# Patient Record
Sex: FEMALE | Race: WHITE | Employment: FULL TIME | URBAN - METROPOLITAN AREA
[De-identification: names, ages, dates, MRNs, and addresses within clinical notes are randomized per-mention and may not be internally consistent; named-entity substitution may affect disease eponyms.]

---

## 2019-07-08 ENCOUNTER — HOSPITAL ENCOUNTER (OUTPATIENT)
Dept: WOMENS IMAGING | Age: 44
Discharge: HOME | End: 2019-07-10
Payer: COMMERCIAL

## 2019-07-08 DIAGNOSIS — Z12.31 ENCOUNTER FOR SCREENING MAMMOGRAM FOR BREAST CANCER: ICD-10-CM

## 2019-07-08 PROCEDURE — 77063 BREAST TOMOSYNTHESIS BI: CPT

## 2019-07-15 ENCOUNTER — HOSPITAL ENCOUNTER (OUTPATIENT)
Dept: WOMENS IMAGING | Age: 44
Discharge: HOME | End: 2019-07-17
Payer: COMMERCIAL

## 2019-07-15 ENCOUNTER — HOSPITAL ENCOUNTER (OUTPATIENT)
Dept: ULTRASOUND IMAGING | Age: 44
Discharge: HOME | End: 2019-07-17
Payer: COMMERCIAL

## 2019-07-15 DIAGNOSIS — R92.8 ABNORMAL MAMMOGRAM: ICD-10-CM

## 2019-07-15 PROCEDURE — G0279 TOMOSYNTHESIS, MAMMO: HCPCS

## 2019-07-15 PROCEDURE — 76642 ULTRASOUND BREAST LIMITED: CPT

## 2020-12-28 ENCOUNTER — HOSPITAL ENCOUNTER (OUTPATIENT)
Dept: WOMENS IMAGING | Age: 45
Discharge: HOME | End: 2020-12-30
Payer: COMMERCIAL

## 2020-12-28 PROCEDURE — 77067 SCR MAMMO BI INCL CAD: CPT

## 2021-01-14 ENCOUNTER — HOSPITAL ENCOUNTER (OUTPATIENT)
Dept: WOMENS IMAGING | Age: 46
Discharge: HOME | End: 2021-01-16
Payer: COMMERCIAL

## 2021-01-14 DIAGNOSIS — R92.8 ABNORMAL MAMMOGRAM: ICD-10-CM

## 2021-01-14 PROCEDURE — G0279 TOMOSYNTHESIS, MAMMO: HCPCS

## 2022-05-06 ENCOUNTER — OFFICE VISIT (OUTPATIENT)
Dept: OBGYN CLINIC | Age: 47
End: 2022-05-06
Payer: COMMERCIAL

## 2022-05-06 VITALS — BODY MASS INDEX: 38.41 KG/M2 | HEIGHT: 64 IN | WEIGHT: 225 LBS

## 2022-05-06 DIAGNOSIS — Z12.31 ENCOUNTER FOR SCREENING MAMMOGRAM FOR MALIGNANT NEOPLASM OF BREAST: ICD-10-CM

## 2022-05-06 DIAGNOSIS — Z01.419 WOMEN'S ANNUAL ROUTINE GYNECOLOGICAL EXAMINATION: ICD-10-CM

## 2022-05-06 DIAGNOSIS — N94.6 DYSMENORRHEA: ICD-10-CM

## 2022-05-06 DIAGNOSIS — Z12.4 ENCOUNTER FOR PAP SMEAR OF CERVIX WITH HPV DNA COTESTING: ICD-10-CM

## 2022-05-06 DIAGNOSIS — Z01.419 WOMEN'S ANNUAL ROUTINE GYNECOLOGICAL EXAMINATION: Primary | ICD-10-CM

## 2022-05-06 PROCEDURE — 99386 PREV VISIT NEW AGE 40-64: CPT | Performed by: ADVANCED PRACTICE MIDWIFE

## 2022-05-06 RX ORDER — IBUPROFEN 800 MG/1
800 TABLET ORAL EVERY 8 HOURS PRN
Qty: 60 TABLET | Refills: 1 | Status: SHIPPED | OUTPATIENT
Start: 2022-05-06 | End: 2022-05-21

## 2022-05-06 ASSESSMENT — PATIENT HEALTH QUESTIONNAIRE - PHQ9
SUM OF ALL RESPONSES TO PHQ9 QUESTIONS 1 & 2: 0
SUM OF ALL RESPONSES TO PHQ QUESTIONS 1-9: 0
1. LITTLE INTEREST OR PLEASURE IN DOING THINGS: 0
2. FEELING DOWN, DEPRESSED OR HOPELESS: 0

## 2022-05-06 ASSESSMENT — ENCOUNTER SYMPTOMS
COUGH: 0
TROUBLE SWALLOWING: 0
VOICE CHANGE: 0
DIARRHEA: 0
ABDOMINAL PAIN: 0
RHINORRHEA: 0
SORE THROAT: 0
NAUSEA: 0
CONSTIPATION: 0
VOMITING: 0
SHORTNESS OF BREATH: 0

## 2022-05-06 NOTE — PROGRESS NOTES
Chief Complaint:     Thang Fernández is a 51 y.o. female who presents here today for complaints of:      Chief Complaint   Patient presents with    Annual Exam     History of Present Illness:     Thang Fernández is a 51 y.o. female who presents for her annual exam.    Concerns Today:    None    Prior Pap History:   1/7/14 - NILM, HPV Negative    Menstrual Cycle:  Becoming Irregular - occurring approximately every 23-40 days  Duration - 3-5 days  Volume - \"Normal\"  Mid-cycle spotting/bleeding - No  Postcoital bleeding - No  Discomfort/Cramping - symptom severity is moderate, occurring first 1-2 days of flow    Past Medical History: Allergies:  Patient has no known allergies. Patient's last menstrual period was 04/26/2022. Obstetrical History:  No obstetric history on file. History reviewed. No pertinent past medical history. Medications:     No current outpatient medications on file prior to visit. No current facility-administered medications on file prior to visit. Review of Systems:     Review of Systems   Constitutional: Negative for activity change, appetite change, chills, diaphoresis, fatigue, fever and unexpected weight change. HENT: Negative for congestion, postnasal drip, rhinorrhea, sneezing, sore throat, trouble swallowing and voice change. Respiratory: Negative for cough and shortness of breath. Cardiovascular: Negative for chest pain. Gastrointestinal: Negative for abdominal pain, constipation, diarrhea, nausea and vomiting. Genitourinary: Positive for menstrual problem. Negative for difficulty urinating, dyspareunia, dysuria, frequency, genital sores, pelvic pain, vaginal bleeding, vaginal discharge and vaginal pain. Musculoskeletal: Negative for arthralgias and myalgias. Neurological: Negative for dizziness, syncope and headaches. Hematological: Negative for adenopathy. All other systems reviewed and are negative.     Physical Exam:     Vitals:  Ht 5' 4\" (1.626 m) Wt 225 lb (102.1 kg)   LMP 04/26/2022   BMI 38.62 kg/m²     Physical Exam  Vitals and nursing note reviewed. Constitutional:       General: She is not in acute distress. Appearance: Normal appearance. She is not ill-appearing, toxic-appearing or diaphoretic. HENT:      Head: Normocephalic. Nose: No congestion or rhinorrhea. Mouth/Throat:      Mouth: Mucous membranes are moist.   Eyes:      General: No scleral icterus. Right eye: No discharge. Left eye: No discharge. Cardiovascular:      Rate and Rhythm: Normal rate and regular rhythm. Pulses: Normal pulses. Pulmonary:      Effort: Pulmonary effort is normal. No respiratory distress. Abdominal:      Palpations: Abdomen is soft. Hernia: There is no hernia in the left inguinal area or right inguinal area. Genitourinary:     General: Normal vulva. Exam position: Lithotomy position. Pubic Area: No rash or pubic lice. Labia:         Right: No rash, tenderness, lesion or injury. Left: No rash, tenderness, lesion or injury. Urethra: No prolapse, urethral pain, urethral swelling or urethral lesion. Vagina: No signs of injury and foreign body. No vaginal discharge, erythema, tenderness, bleeding, lesions or prolapsed vaginal walls. Cervix: No cervical motion tenderness, discharge, friability, lesion, erythema, cervical bleeding or eversion. Uterus: Not deviated, not enlarged, not fixed, not tender and no uterine prolapse. Adnexa:         Right: No mass, tenderness or fullness. Left: No mass, tenderness or fullness. Rectum: No mass or external hemorrhoid. Musculoskeletal:         General: Normal range of motion. Cervical back: Normal range of motion and neck supple. Right lower leg: No edema. Left lower leg: No edema. Lymphadenopathy:      Lower Body: No right inguinal adenopathy. No left inguinal adenopathy.    Skin:     General: Skin is warm and dry. Capillary Refill: Capillary refill takes less than 2 seconds. Coloration: Skin is not jaundiced or pale. Neurological:      Mental Status: She is alert and oriented to person, place, and time. Mental status is at baseline. Motor: No weakness. Coordination: Coordination normal.      Gait: Gait normal.   Psychiatric:         Mood and Affect: Mood normal.         Behavior: Behavior normal.         Assessment:      Diagnosis Orders   1. Women's annual routine gynecological examination  PAP SMEAR   2. Encounter for Pap smear of cervix with HPV DNA cotesting  PAP SMEAR   3. Encounter for screening mammogram for malignant neoplasm of breast  [facility] DIGITAL SCREEN W OR WO CAD BILATERAL   4. Dysmenorrhea  ibuprofen (ADVIL;MOTRIN) 800 MG tablet     Plan:     1. Annual Exam, Screening for STD's  Pap - Collected  Screening for STD's - Declined    2. Dysmenorrhea, Perimenopausal  Periods are beginning to become irregular. Use ibuprofen as needed to relieve cramping. 3. Screening for Breast Cancer  Mammogram referral given    Follow Up:  Return in about 1 year (around 5/6/2023) for Annual Well Woman Visit. Orders Placed This Encounter   Procedures    [facility] DIGITAL SCREEN W OR WO CAD BILATERAL     Standing Status:   Future     Standing Expiration Date:   5/6/2023     Order Specific Question:   Reason for exam:     Answer:   Screening mammogram    PAP SMEAR     Standing Status:   Future     Standing Expiration Date:   5/6/2023     Order Specific Question:   Collection Type     Answer: Thin Prep     Order Specific Question:   Prior Abnormal Pap Test     Answer:   No     Order Specific Question:   Screening or Diagnostic     Answer:   Screening     Order Specific Question:   HPV Requested?      Answer:   Yes     Comments:   16/18     Order Specific Question:   High Risk Patient     Answer:   N/A     Orders Placed This Encounter   Medications    ibuprofen (ADVIL;MOTRIN) 800 MG tablet     Sig: Take 1 tablet by mouth every 8 hours as needed for Pain     Dispense:  60 tablet     Refill:  [facility], [facility]

## 2022-05-11 LAB
HPV COMMENT: NORMAL
HPV TYPE 16: NOT DETECTED
HPV TYPE 18: NOT DETECTED
HPVOH (OTHER TYPES): NOT DETECTED

## 2022-05-16 DIAGNOSIS — N76.0 BACTERIAL VAGINITIS: Primary | ICD-10-CM

## 2022-05-16 DIAGNOSIS — B96.89 BACTERIAL VAGINITIS: Primary | ICD-10-CM

## 2022-05-16 RX ORDER — METRONIDAZOLE 500 MG/1
500 TABLET ORAL 2 TIMES DAILY
Qty: 14 TABLET | Refills: 1 | Status: SHIPPED | OUTPATIENT
Start: 2022-05-16 | End: 2022-08-14

## 2022-05-17 NOTE — RESULT ENCOUNTER NOTE
1.  Please send a normal pap letter    2. Pap shows BV  Rx:  Flagyl 500m tablet BID x7 days  Pharmacy:  [address] [place] [address] [address] [place] [id number] [place]:  No Access    Please make sure patient is aware. Thanks!

## 2022-06-10 ENCOUNTER — HOSPITAL ENCOUNTER (OUTPATIENT)
Dept: WOMENS IMAGING | Age: 47
Discharge: HOME | End: 2022-06-12
Payer: COMMERCIAL

## 2022-06-10 VITALS — BODY MASS INDEX: 38.62 KG/M2 | HEIGHT: 64 IN

## 2022-06-10 DIAGNOSIS — Z12.31 ENCOUNTER FOR SCREENING MAMMOGRAM FOR MALIGNANT NEOPLASM OF BREAST: ICD-10-CM

## 2022-06-10 PROCEDURE — 77063 BREAST TOMOSYNTHESIS BI: CPT

## 2023-09-21 ENCOUNTER — TELEPHONE (OUTPATIENT)
Dept: PRIMARY CARE | Facility: CLINIC | Age: 48
End: 2023-09-21

## 2023-09-21 NOTE — TELEPHONE ENCOUNTER
Pt is requesting a referral for knee pain to orthopedics as well as an order for a mammogram   Please advise if pt needs an appointment or if you will just put these orders in, thanks!

## 2023-09-22 DIAGNOSIS — Z12.31 BREAST CANCER SCREENING BY MAMMOGRAM: Primary | ICD-10-CM

## 2023-09-22 PROBLEM — M25.562 KNEE PAIN, BILATERAL: Status: ACTIVE | Noted: 2023-09-22

## 2023-09-22 PROBLEM — R92.8 ABNORMAL MAMMOGRAM: Status: ACTIVE | Noted: 2023-09-22

## 2023-09-22 PROBLEM — M25.561 KNEE PAIN, BILATERAL: Status: ACTIVE | Noted: 2023-09-22

## 2023-10-02 ENCOUNTER — OFFICE VISIT (OUTPATIENT)
Dept: ORTHOPEDIC SURGERY | Facility: CLINIC | Age: 48
End: 2023-10-02
Payer: COMMERCIAL

## 2023-10-02 ENCOUNTER — ANCILLARY PROCEDURE (OUTPATIENT)
Dept: RADIOLOGY | Facility: CLINIC | Age: 48
End: 2023-10-02
Payer: COMMERCIAL

## 2023-10-02 DIAGNOSIS — M25.562 ACUTE BILATERAL KNEE PAIN: ICD-10-CM

## 2023-10-02 DIAGNOSIS — M25.561 ACUTE BILATERAL KNEE PAIN: Primary | ICD-10-CM

## 2023-10-02 DIAGNOSIS — M25.561 ACUTE BILATERAL KNEE PAIN: ICD-10-CM

## 2023-10-02 DIAGNOSIS — M25.562 ACUTE BILATERAL KNEE PAIN: Primary | ICD-10-CM

## 2023-10-02 PROCEDURE — 73564 X-RAY EXAM KNEE 4 OR MORE: CPT | Mod: BILATERAL PROCEDURE | Performed by: ORTHOPAEDIC SURGERY

## 2023-10-02 PROCEDURE — 2500000005 HC RX 250 GENERAL PHARMACY W/O HCPCS: Performed by: ORTHOPAEDIC SURGERY

## 2023-10-02 PROCEDURE — 99213 OFFICE O/P EST LOW 20 MIN: CPT | Performed by: ORTHOPAEDIC SURGERY

## 2023-10-02 PROCEDURE — 73564 X-RAY EXAM KNEE 4 OR MORE: CPT | Mod: 50

## 2023-10-02 PROCEDURE — 99203 OFFICE O/P NEW LOW 30 MIN: CPT | Performed by: ORTHOPAEDIC SURGERY

## 2023-10-02 PROCEDURE — 2500000004 HC RX 250 GENERAL PHARMACY W/ HCPCS (ALT 636 FOR OP/ED): Performed by: ORTHOPAEDIC SURGERY

## 2023-10-02 PROCEDURE — 20610 DRAIN/INJ JOINT/BURSA W/O US: CPT | Mod: LT | Performed by: ORTHOPAEDIC SURGERY

## 2023-10-02 RX ORDER — IBUPROFEN 800 MG/1
800 TABLET ORAL 3 TIMES DAILY
Qty: 90 TABLET | Refills: 0 | Status: CANCELLED | OUTPATIENT
Start: 2023-10-02 | End: 2023-11-01

## 2023-10-02 RX ORDER — IBUPROFEN 800 MG/1
800 TABLET ORAL 3 TIMES DAILY
Qty: 90 TABLET | Refills: 0 | Status: SHIPPED | OUTPATIENT
Start: 2023-10-02 | End: 2023-11-01

## 2023-10-02 RX ORDER — LIDOCAINE HYDROCHLORIDE 10 MG/ML
8 INJECTION INFILTRATION; PERINEURAL
Status: COMPLETED | OUTPATIENT
Start: 2023-10-02 | End: 2023-10-02

## 2023-10-02 RX ORDER — TRIAMCINOLONE ACETONIDE 40 MG/ML
40 INJECTION, SUSPENSION INTRA-ARTICULAR; INTRAMUSCULAR
Status: COMPLETED | OUTPATIENT
Start: 2023-10-02 | End: 2023-10-02

## 2023-10-02 RX ADMIN — TRIAMCINOLONE ACETONIDE 40 MG: 40 INJECTION, SUSPENSION INTRA-ARTICULAR; INTRAMUSCULAR at 14:52

## 2023-10-02 RX ADMIN — LIDOCAINE HYDROCHLORIDE 8 ML: 10 INJECTION, SOLUTION INFILTRATION; PERINEURAL at 14:52

## 2023-10-02 NOTE — PROGRESS NOTES
History of Present Illness  Bilateral knee pain    History of Present Illness   Patient presents today for chief complaint of chronic bilateral knee pain.  States that the knees have been acting up the past 3 to 4 months without relief.  She does take anti-inflammatories it does take the edge off a little bit.  She denies any surgeries or previous injections into the knees denies any instability no locking.  She has not used any type of compression sleeve       No past medical history on file.    Medication Documentation Review Audit    **Prior to Admission medications have not yet been reviewed**         No Known Allergies    Social History     Socioeconomic History    Marital status:      Spouse name: Not on file    Number of children: Not on file    Years of education: Not on file    Highest education level: Not on file   Occupational History    Not on file   Tobacco Use    Smoking status: Not on file    Smokeless tobacco: Not on file   Substance and Sexual Activity    Alcohol use: Not on file    Drug use: Not on file    Sexual activity: Not on file   Other Topics Concern    Not on file   Social History Narrative    Not on file     Social Determinants of Health     Financial Resource Strain: Not on file   Food Insecurity: Not on file   Transportation Needs: Not on file   Physical Activity: Not on file   Stress: Not on file   Social Connections: Not on file   Intimate Partner Violence: Not on file   Housing Stability: Not on file       No past surgical history on file.      Review of Systems   GENERAL: Negative for malaise, significant weight loss, fever  MUSCULOSKELETAL: see HPI  NEURO:  Negative     Physical Exam  VS documented in chart  Head: NC/AT  Skin: Intact  Left knee exam shows skin intact full range of motion of the knee there is some retropatellar crepitation with passive range of motion.  There is medial joint line tenderness palpation as well as some tenderness over the inferior pole of  the patella.  Knee is stable/throughout lower extremity gross neurovascular intact    Right knee exam shows skin intact with no erythema no signs of infection trace effusion appreciated on exam today.  There is medial joint line tenderness palpation slight varus alignment of the knee.  Knee is stable/throughout lower extremity gross neurovascular intact     Radiographs  No images are attached to the encounter.     Assessment  Osteoarthrosis side: bilateral knee      Plan  We discussed with the patient the diagnosis of degenerative joint disease of the knee.  We reviewed an evidence-based approach to osteoarthritis of the knee.  We strongly encouraged low-impact aerobic activity and non-opioid analgesics.     We discussed temporary pain relief with corticosteroid injections and the associated risks.  We also discussed the conflicting evidence regarding viscosupplementation and potential long-term risks with NSAID´s.  We reviewed the role of bracing for instability and physical therapy for atrophy and gait abnormalities.  We reviewed that the only curative process is for a joint arthroplasty.  The patient elected for Injections  Plan:  1.  Medrol Dosepak: Oral steroid.  Deffered  2.  NSAID: Prescription sent to the pharmacy.  GI side effects and medical risks discussed.    Ibuprofen 800 mg  3.  Ice: 30 minutes on/off  4.  Bracing: Patient will decide on over-the-counter versus prescription bracing  5.  Home exercise program/medically directed therapy handout was given    L Inj/Asp: L knee on 10/2/2023 2:52 PM  Indications: pain  Details: 22 G needle, anterolateral approach  Medications: 40 mg triamcinolone acetonide 40 mg/mL; 8 mL lidocaine 10 mg/mL (1 %)  Outcome: tolerated well, no immediate complications  Procedure, treatment alternatives, risks and benefits explained, specific risks discussed. Consent was given by the patient. Immediately prior to procedure a time out was called to verify the correct patient,  procedure, equipment, support staff and site/side marked as required. Patient was prepped and draped in the usual sterile fashion.            I will see them in 1 month for recheck, sooner if there is any problems.  Questions were answered.

## 2023-10-02 NOTE — LETTER
October 2, 2023     Antoine Sosa DO  [address]  [place]  [facility] [address]    Patient: Josefina Meneses   YOB: 1975   Date of Visit: 10/2/2023       Dear Dr. Antoine Sosa DO:    Thank you for referring Josefina Meneses to me for evaluation. Below are my notes for this consultation.  If you have questions, please do not hesitate to call me. I look forward to following your patient along with you.       Sincerely,     Diego Garcia MD      CC: No Recipients  ______________________________________________________________________________________         History of Present Illness  No chief complaint on file.    History of Present Illness   Patient presents today for chief complaint of chronic bilateral knee pain.  States that the knees have been acting up the past 3 to 4 months without relief.  She does take anti-inflammatories it does take the edge off a little bit.  She denies any surgeries or previous injections into the knees denies any instability no locking.  She has not used any type of compression sleeve       No past medical history on file.    Medication Documentation Review Audit    **Prior to Admission medications have not yet been reviewed**         No Known Allergies    Social History     Socioeconomic History   • Marital status:      Spouse name: Not on file   • Number of children: Not on file   • Years of education: Not on file   • Highest education level: Not on file   Occupational History   • Not on file   Tobacco Use   • Smoking status: Not on file   • Smokeless tobacco: Not on file   Substance and Sexual Activity   • Alcohol use: Not on file   • Drug use: Not on file   • Sexual activity: Not on file   Other Topics Concern   • Not on file   Social History Narrative   • Not on file     Social Determinants of Health     Financial Resource Strain: Not on file   Food Insecurity: Not on file   Transportation Needs: Not on file   Physical Activity: Not on file   Stress: Not  on file   Social Connections: Not on file   Intimate Partner Violence: Not on file   Housing Stability: Not on file       No past surgical history on file.      Review of Systems   GENERAL: Negative for malaise, significant weight loss, fever  MUSCULOSKELETAL: see HPI  NEURO:  Negative     Exam  {side:62973} Knee:  Skin healthy and intact  No gross swelling or ecchymosis  Alignment: {Desc; knee alignment:23612}     Effusion: {MILD, MOD, SEV:17544}        ROM: {knee range of motion:48694}  {MILD, MOD, SEV:97736} Crepitus with range of motion  Tenderness to palpation over medial and lateral joint line and with patellar compression      No laxity to valgus stress  No laxity to varus stress  Negative Lachman´s test  Negative posterior drawer test  Mild pain with Clayton´s test  Logrolling of hip {POSITIVE/NEGATIVE:11611}  No pain with internal rotation of the hip  Straight leg raise negative  Neurovascular exam normal distally  2+ DP pulse and good cap refill  Physical Exam  VS documented in chart  Head: NC/AT  Skin: Intact  Left knee exam shows skin intact full range of motion of the knee there is some retropatellar crepitation with passive range of motion.  There is medial joint line tenderness palpation as well as some tenderness over the inferior pole of the patella.  Knee is stable/throughout lower extremity gross neurovascular intact    Right knee exam shows skin intact with no erythema no signs of infection trace effusion appreciated on exam today.  There is medial joint line tenderness palpation slight varus alignment of the knee.  Knee is stable/throughout lower extremity gross neurovascular intact     Radiographs  No images are attached to the encounter.     Assessment  Osteoarthrosis side: bilateral knee      Plan  We discussed with the patient the diagnosis of degenerative joint disease of the knee.  We reviewed an evidence-based approach to osteoarthritis of the knee.  We strongly encouraged low-impact  aerobic activity and non-opioid analgesics.     We discussed temporary pain relief with corticosteroid injections and the associated risks.  We also discussed the conflicting evidence regarding viscosupplementation and potential long-term risks with NSAID´s.  We reviewed the role of bracing for instability and physical therapy for atrophy and gait abnormalities.  We reviewed that the only curative process is for a joint arthroplasty.  The patient elected for Injections  Plan:  1.  Medrol Dosepak: Oral steroid.  Deffered  2.  NSAID: Prescription sent to the pharmacy.  GI side effects and medical risks discussed.    Ibuprofen 800 mg  3.  Ice: 30 minutes on/off  4.  Bracing: Patient will decide on over-the-counter versus prescription bracing  5.  Home exercise program/medically directed therapy handout was given    L Inj/Asp: L knee on 10/2/2023 2:52 PM  Indications: pain  Details: 22 G needle, anterolateral approach  Medications: 40 mg triamcinolone acetonide 40 mg/mL; 8 mL lidocaine 10 mg/mL (1 %)  Outcome: tolerated well, no immediate complications  Procedure, treatment alternatives, risks and benefits explained, specific risks discussed. Consent was given by the patient. Immediately prior to procedure a time out was called to verify the correct patient, procedure, equipment, support staff and site/side marked as required. Patient was prepped and draped in the usual sterile fashion.            I will see them in 1 month for recheck, sooner if there is any problems.  Questions were answered.

## 2023-10-30 ENCOUNTER — OFFICE VISIT (OUTPATIENT)
Dept: ORTHOPEDIC SURGERY | Facility: CLINIC | Age: 48
End: 2023-10-30
Payer: COMMERCIAL

## 2023-10-30 DIAGNOSIS — M17.10 ARTHRITIS OF KNEE: Primary | ICD-10-CM

## 2023-10-30 PROCEDURE — 99213 OFFICE O/P EST LOW 20 MIN: CPT | Performed by: ORTHOPAEDIC SURGERY

## 2023-10-30 NOTE — PROGRESS NOTES
History of Present Illness   Patient presents for follow-up from her left knee cortisone injection.  States that knee felt about 40 to 50% better.  She was also on vacation.  She still has pain on the inside part of the knee and pain going up and down stairs.     Review of Systems   GENERAL: Negative for malaise, significant weight loss, fever  MUSCULOSKELETAL: see HPI  NEURO:  Negative     Physical Exam  General appearance well-nourished well-developed A&Ox3  Bilateral knee: Flexion to 110, 5 out of 5 quad strength.  Trace effusion.  Some pain along the medial joint line     Imaging  Bilateral grade 4 medial arthritis with a varus collapse       Assessment   Bilateral knee arthritis, left greater than right     Plan  Recommended viscosupplementation/gel injections.  I think is medically reasonable and appropriate.  Unfortunately discussed with her her ultimate long-term option is probably looking at a knee replacement.  Ibuprofen  Ice  Over-the-counter bracing  Encouraged her to get in the pool for home exercise  Follow-up for gel injections      History reviewed. No pertinent past medical history.    Medication Documentation Review Audit       Reviewed by Annemarie Thurman MA (Medical Assistant) on 10/30/23 at 1619      Medication Order Taking? Sig Documenting Provider Last Dose Status   ibuprofen 800 mg tablet [id number]  Take 1 tablet (800 mg) by mouth 3 times a day. Diego Garcia MD  Active                    No Known Allergies    Social History     Socioeconomic History    Marital status:      Spouse name: Not on file    Number of children: Not on file    Years of education: Not on file    Highest education level: Not on file   Occupational History    Not on file   Tobacco Use    Smoking status: Not on file    Smokeless tobacco: Not on file   Substance and Sexual Activity    Alcohol use: Not on file    Drug use: Not on file    Sexual activity: Not on file   Other Topics Concern    Not on file   Social  History Narrative    Not on file     Social Determinants of Health     Financial Resource Strain: Not on file   Food Insecurity: Not on file   Transportation Needs: Not on file   Physical Activity: Not on file   Stress: Not on file   Social Connections: Not on file   Intimate Partner Violence: Not on file   Housing Stability: Not on file       History reviewed. No pertinent surgical history.

## 2023-10-31 ENCOUNTER — ANCILLARY PROCEDURE (OUTPATIENT)
Dept: RADIOLOGY | Facility: CLINIC | Age: 48
End: 2023-10-31
Payer: COMMERCIAL

## 2023-10-31 DIAGNOSIS — Z12.31 BREAST CANCER SCREENING BY MAMMOGRAM: ICD-10-CM

## 2023-10-31 PROCEDURE — 77063 BREAST TOMOSYNTHESIS BI: CPT

## 2023-10-31 PROCEDURE — 77067 SCR MAMMO BI INCL CAD: CPT | Mod: BILATERAL PROCEDURE | Performed by: RADIOLOGY

## 2023-10-31 PROCEDURE — 77063 BREAST TOMOSYNTHESIS BI: CPT | Mod: BILATERAL PROCEDURE | Performed by: RADIOLOGY

## 2023-11-01 ENCOUNTER — HOSPITAL ENCOUNTER (OUTPATIENT)
Dept: RADIOLOGY | Facility: EXTERNAL LOCATION | Age: 48
Discharge: HOME | End: 2023-11-01

## 2023-11-01 DIAGNOSIS — Z12.31 BREAST CANCER SCREENING BY MAMMOGRAM: ICD-10-CM

## 2023-11-03 NOTE — RESULT ENCOUNTER NOTE
PLEASE CALL Josefina Meneses AND LET HER KNOW THE MAMMOGRAM IS NORMAL.  PLEASE PLAN ON REPEATING IN ONE YEAR.

## 2024-04-15 ENCOUNTER — OFFICE VISIT (OUTPATIENT)
Dept: ORTHOPEDIC SURGERY | Facility: CLINIC | Age: 49
End: 2024-04-15
Payer: COMMERCIAL

## 2024-04-15 DIAGNOSIS — M17.10 ARTHRITIS OF KNEE: ICD-10-CM

## 2024-04-15 DIAGNOSIS — M17.0 PRIMARY OSTEOARTHRITIS OF BOTH KNEES: Primary | ICD-10-CM

## 2024-04-15 DIAGNOSIS — M25.562 ACUTE BILATERAL KNEE PAIN: ICD-10-CM

## 2024-04-15 DIAGNOSIS — M25.561 ACUTE BILATERAL KNEE PAIN: ICD-10-CM

## 2024-04-15 PROCEDURE — 20610 DRAIN/INJ JOINT/BURSA W/O US: CPT | Mod: 50 | Performed by: ORTHOPAEDIC SURGERY

## 2024-04-15 PROCEDURE — 99214 OFFICE O/P EST MOD 30 MIN: CPT | Performed by: ORTHOPAEDIC SURGERY

## 2024-04-15 PROCEDURE — 2500000005 HC RX 250 GENERAL PHARMACY W/O HCPCS: Performed by: ORTHOPAEDIC SURGERY

## 2024-04-15 PROCEDURE — 2500000004 HC RX 250 GENERAL PHARMACY W/ HCPCS (ALT 636 FOR OP/ED): Performed by: ORTHOPAEDIC SURGERY

## 2024-04-15 RX ORDER — IBUPROFEN 800 MG/1
800 TABLET ORAL 3 TIMES DAILY
Qty: 90 TABLET | Refills: 0 | Status: SHIPPED | OUTPATIENT
Start: 2024-04-15 | End: 2024-05-15

## 2024-04-15 NOTE — PROGRESS NOTES
History of Present Illness   Patient presents for bilateral cortisone injections.  She states that done these in the past with excellent relief.  Last time we injected her knees were in October 2023.  She has, potentially also doing gel injection in the future she has several months of relief.  With these pre-CERT it.  She is taking ibuprofen which simply helps.  Ices her knees has a home-based exercise plan     Review of Systems   GENERAL: Negative for malaise, significant weight loss, fever  MUSCULOSKELETAL: see HPI  NEURO:  Negative     Physical Exam  General appearance well-nourished well-developed A&Ox3  Bilateral knee: Flexion to 110, 5 out of 5 quad strength.  Trace effusion.  Some pain along the medial joint line.  Normal stability     Imaging  Bilateral grade 4 medial arthritis with a varus collapse     Assessment   Bilateral knee arthritis, left greater than right     Plan  Will pre-certify for gel injections when her cortisone junctions are wear off  Ibuprofen.  Sent to the pharmacy.  Medication.  Risks discussed  Ice  Over-the-counter bracing  Encouraged her to get in the pool for home exercise  Follow-up for gel injections  L Inj/Asp: bilateral knee on 4/16/2024 3:04 PM  Indications: pain  Details: 22 G needle, anterolateral approach  Medications (Right): 8 mL lidocaine 10 mg/mL (1 %); 2.5 mg triamcinolone acetonide 40 mg/mL  Medications (Left): 8 mL lidocaine 10 mg/mL (1 %); 2.5 mg triamcinolone acetonide 40 mg/mL  Outcome: tolerated well, no immediate complications  Procedure, treatment alternatives, risks and benefits explained, specific risks discussed. Consent was given by the patient. Immediately prior to procedure a time out was called to verify the correct patient, procedure, equipment, support staff and site/side marked as required. Patient was prepped and draped in the usual sterile fashion.              No past medical history on file.    Medication Documentation Review Audit       Reviewed  by Annemarie Thurman MA (Medical Assistant) on 10/30/23 at 1619      Medication Order Taking? Sig Documenting Provider Last Dose Status   ibuprofen 800 mg tablet [id number]  Take 1 tablet (800 mg) by mouth 3 times a day. Diego Garcia MD  Active                    No Known Allergies    Social History     Socioeconomic History    Marital status:      Spouse name: Not on file    Number of children: Not on file    Years of education: Not on file    Highest education level: Not on file   Occupational History    Not on file   Tobacco Use    Smoking status: Not on file    Smokeless tobacco: Not on file   Substance and Sexual Activity    Alcohol use: Not on file    Drug use: Not on file    Sexual activity: Not on file   Other Topics Concern    Not on file   Social History Narrative    Not on file     Social Determinants of Health     Financial Resource Strain: Not on file   Food Insecurity: Not on file   Transportation Needs: Not on file   Physical Activity: Not on file   Stress: Not on file   Social Connections: Not on file   Intimate Partner Violence: Not on file   Housing Stability: Not on file       No past surgical history on file.

## 2024-04-15 NOTE — PROGRESS NOTES
History of Present Illness   No chief complaint on file.      History of Present Illness   []  Imaging  Radiographs Knee: No acute fractures or dislocations.  No arthritic change and well-preserved joint space    Assessment:   []    Plan:  We reviewed the role of imaging, physical therapy, injections and the time frame to healing and correlation with outcome.  At this point I recommended getting an MRI and advanced imaging for potential underlying internal derangement.  Discussed the possibility of meniscus, cartilage or ligament pathology and the potential need for surgery.  Patient will plan to follow-up with us after MRI for further recommendations on ongoing care    NSAID: [].  GI side effects and medical risks discussed  Ice: 30 minutes on and off  Exercise home program: Medically directed knee therapy / Handout given  []     Physical Exam  Well-nourished, well-developed. No acute distress. Alert and oriented x3. Responds appropriately to questioning. Good mood. Normal affect.  Physical Exam  [] Knee:  Skin healthy and intact  No gross swelling or ecchymosis  Trace to 1+ Effusion: Crepitance with range of motion  ROM: []  Positive Clayton´s test/PositiveApley Grind  Neurovascular exam normal distally  Palpable pedal pulse and good cap refill     Review of Systems   GENERAL: Negative for malaise, significant weight loss, fever  MUSCULOSKELETAL: See HPI  NEURO:  Negative     No past medical history on file.    Medication Documentation Review Audit       Reviewed by Annemarie Thurman MA (Medical Assistant) on 10/30/23 at 1619      Medication Order Taking? Sig Documenting Provider Last Dose Status   ibuprofen 800 mg tablet [id number]  Take 1 tablet (800 mg) by mouth 3 times a day. Diego Garcia MD  Active                    No Known Allergies    Social History     Socioeconomic History    Marital status:      Spouse name: Not on file    Number of children: Not on file    Years of education: Not on file     Highest education level: Not on file   Occupational History    Not on file   Tobacco Use    Smoking status: Not on file    Smokeless tobacco: Not on file   Substance and Sexual Activity    Alcohol use: Not on file    Drug use: Not on file    Sexual activity: Not on file   Other Topics Concern    Not on file   Social History Narrative    Not on file     Social Determinants of Health     Financial Resource Strain: Not on file   Food Insecurity: Not on file   Transportation Needs: Not on file   Physical Activity: Not on file   Stress: Not on file   Social Connections: Not on file   Intimate Partner Violence: Not on file   Housing Stability: Not on file       No past surgical history on file.    No results found.                  Scribe Attestation  By signing my name below, Mari MORTENSEN Scribe   attest that this documentation has been prepared under the direction and in the presence of Diego Garcia MD.

## 2024-04-16 PROCEDURE — 20610 DRAIN/INJ JOINT/BURSA W/O US: CPT | Performed by: ORTHOPAEDIC SURGERY

## 2024-04-16 RX ORDER — TRIAMCINOLONE ACETONIDE 40 MG/ML
2.5 INJECTION, SUSPENSION INTRA-ARTICULAR; INTRAMUSCULAR
Status: COMPLETED | OUTPATIENT
Start: 2024-04-16 | End: 2024-04-16

## 2024-04-16 RX ORDER — LIDOCAINE HYDROCHLORIDE 10 MG/ML
8 INJECTION INFILTRATION; PERINEURAL
Status: COMPLETED | OUTPATIENT
Start: 2024-04-16 | End: 2024-04-16

## 2024-04-16 RX ADMIN — LIDOCAINE HYDROCHLORIDE 8 ML: 10 INJECTION, SOLUTION INFILTRATION; PERINEURAL at 15:04

## 2024-04-16 RX ADMIN — TRIAMCINOLONE ACETONIDE 2.5 MG: 40 INJECTION, SUSPENSION INTRA-ARTICULAR; INTRAMUSCULAR at 15:04

## 2024-08-12 ENCOUNTER — OFFICE VISIT (OUTPATIENT)
Dept: ORTHOPEDIC SURGERY | Facility: CLINIC | Age: 49
End: 2024-08-12
Payer: COMMERCIAL

## 2024-08-12 DIAGNOSIS — M17.0 PRIMARY OSTEOARTHRITIS OF BOTH KNEES: Primary | ICD-10-CM

## 2024-08-12 PROCEDURE — 20610 DRAIN/INJ JOINT/BURSA W/O US: CPT | Mod: 50 | Performed by: ORTHOPAEDIC SURGERY

## 2024-08-12 PROCEDURE — 99214 OFFICE O/P EST MOD 30 MIN: CPT | Performed by: ORTHOPAEDIC SURGERY

## 2024-08-12 PROCEDURE — 2500000004 HC RX 250 GENERAL PHARMACY W/ HCPCS (ALT 636 FOR OP/ED): Performed by: ORTHOPAEDIC SURGERY

## 2024-08-12 PROCEDURE — 1036F TOBACCO NON-USER: CPT | Performed by: ORTHOPAEDIC SURGERY

## 2024-08-12 PROCEDURE — 99213 OFFICE O/P EST LOW 20 MIN: CPT | Performed by: ORTHOPAEDIC SURGERY

## 2024-08-12 PROCEDURE — 2500000005 HC RX 250 GENERAL PHARMACY W/O HCPCS: Performed by: ORTHOPAEDIC SURGERY

## 2024-08-12 PROCEDURE — 20610 DRAIN/INJ JOINT/BURSA W/O US: CPT | Performed by: ORTHOPAEDIC SURGERY

## 2024-08-12 RX ORDER — LIDOCAINE HYDROCHLORIDE 10 MG/ML
8 INJECTION INFILTRATION; PERINEURAL
Status: COMPLETED | OUTPATIENT
Start: 2024-08-12 | End: 2024-08-12

## 2024-08-12 RX ORDER — TRIAMCINOLONE ACETONIDE 40 MG/ML
40 INJECTION, SUSPENSION INTRA-ARTICULAR; INTRAMUSCULAR
Status: COMPLETED | OUTPATIENT
Start: 2024-08-12 | End: 2024-08-12

## 2024-08-12 NOTE — PROGRESS NOTES
History of Present Illness:   Bilateral knee cortisone injections 04/15/24, reports the injection worked well for the past few months. Over the last couple of weeks the knees began bothering her again. Denies any new injury. Unfortunately her insurance company did not approve gel injections, requesting another cortisone injection today.     Patient presents for bilateral cortisone injections.  She states that done these in the past with excellent relief.  Last time we injected her knees were in October 2023.  She has, potentially also doing gel injection in the future she has several months of relief.  With these pre-CERT it.  She is taking ibuprofen which simply helps.  Ices her knees has a home-based exercise plan     Imaging:  Bilateral grade 4 medial arthritis with a varus collapse    Assessment:  Bilateral knee arthritis, left greater than right     Plan:  Bilateral cortisone injections today  Ibuprofen.  Sent to the pharmacy.  Medication.  Risks discussed  Ice  Over-the-counter bracing  Encouraged her to get in the pool for home exercise    L Inj/Asp: bilateral knee on 8/12/2024 4:37 PM  Indications: pain  Details: 22 G needle, anterolateral approach  Medications (Right): 8 mL lidocaine 10 mg/mL (1 %); 40 mg triamcinolone acetonide 40 mg/mL  Medications (Left): 8 mL lidocaine 10 mg/mL (1 %); 40 mg triamcinolone acetonide 40 mg/mL  Outcome: tolerated well, no immediate complications  Procedure, treatment alternatives, risks and benefits explained, specific risks discussed. Consent was given by the patient. Immediately prior to procedure a time out was called to verify the correct patient, procedure, equipment, support staff and site/side marked as required. Patient was prepped and draped in the usual sterile fashion.           Physical Exam:  General appearance well-nourished well-developed A&Ox3  Bilateral knee: Flexion to 110, 5 out of 5 quad strength.  Trace effusion.  Some pain along the medial joint line.   Normal stability    Review of Systems:   GENERAL: Negative for malaise, significant weight loss, fever  MUSCULOSKELETAL: see HPI  NEURO:  Negative  No past medical history on file.    Medication Documentation Review Audit       Reviewed by Annemarie Thurman MA (Medical Assistant) on 10/30/23 at 1619      Medication Order Taking? Sig Documenting Provider Last Dose Status   ibuprofen 800 mg tablet [id number]  Take 1 tablet (800 mg) by mouth 3 times a day. Diego Garcia MD  Active                    No Known Allergies    Social History     Socioeconomic History    Marital status:      Spouse name: Not on file    Number of children: Not on file    Years of education: Not on file    Highest education level: Not on file   Occupational History    Not on file   Tobacco Use    Smoking status: Not on file    Smokeless tobacco: Not on file   Substance and Sexual Activity    Alcohol use: Not on file    Drug use: Not on file    Sexual activity: Not on file   Other Topics Concern    Not on file   Social History Narrative    Not on file     Social Determinants of Health     Financial Resource Strain: Not on file   Food Insecurity: Not on file   Transportation Needs: Not on file   Physical Activity: Not on file   Stress: Not on file   Social Connections: Not on file   Intimate Partner Violence: Not on file   Housing Stability: Not on file       No past surgical history on file.

## 2024-10-07 ENCOUNTER — OFFICE VISIT (OUTPATIENT)
Dept: URGENT CARE | Age: 49
End: 2024-10-07
Payer: COMMERCIAL

## 2024-10-07 ENCOUNTER — ANCILLARY PROCEDURE (OUTPATIENT)
Dept: URGENT CARE | Age: 49
End: 2024-10-07
Payer: COMMERCIAL

## 2024-10-07 VITALS
HEART RATE: 86 BPM | SYSTOLIC BLOOD PRESSURE: 145 MMHG | TEMPERATURE: 98.6 F | RESPIRATION RATE: 16 BRPM | OXYGEN SATURATION: 99 % | WEIGHT: 240 LBS | HEIGHT: 64 IN | BODY MASS INDEX: 40.97 KG/M2 | DIASTOLIC BLOOD PRESSURE: 90 MMHG

## 2024-10-07 DIAGNOSIS — M25.522 LEFT ELBOW PAIN: ICD-10-CM

## 2024-10-07 DIAGNOSIS — S52.135A CLOSED NONDISPLACED FRACTURE OF NECK OF LEFT RADIUS, INITIAL ENCOUNTER: Primary | ICD-10-CM

## 2024-10-07 RX ORDER — HYDROCODONE BITARTRATE AND ACETAMINOPHEN 5; 325 MG/1; MG/1
1 TABLET ORAL EVERY 6 HOURS PRN
Qty: 20 TABLET | Refills: 0 | Status: SHIPPED | OUTPATIENT
Start: 2024-10-07 | End: 2024-10-14

## 2024-10-07 ASSESSMENT — ENCOUNTER SYMPTOMS
ENDOCRINE NEGATIVE: 1
GASTROINTESTINAL NEGATIVE: 1
RESPIRATORY NEGATIVE: 1
EYES NEGATIVE: 1
HEMATOLOGIC/LYMPHATIC NEGATIVE: 1
PALPITATIONS: 0
MUSCULOSKELETAL NEGATIVE: 1
CONSTITUTIONAL NEGATIVE: 1
ALLERGIC/IMMUNOLOGIC NEGATIVE: 1
PSYCHIATRIC NEGATIVE: 1
NEUROLOGICAL NEGATIVE: 1

## 2024-10-07 NOTE — PROGRESS NOTES
"Subjective   Patient ID: Josefina Meneses is a 48 y.o. female. They present today with a chief complaint of Injury (Fell injured left elbow).    History of Present Illness    Injury  Associated symptoms: no chest pain      Pt presents to urgent care with c/o  L elbow pain s/p fall. Pt states she accidentally tripped and fell onto hard floor.  States her elbow is very tender to touch and hurts to extend it.  Denies numbness, tingling. Denies hitting her head, denies loc.  Pt denies CP, SOB, palpitations, fevers, abd pain, n/v/d, sick contacts, recent travel.        Past Medical History  Allergies as of 10/07/2024    (No Known Allergies)       (Not in a hospital admission)       No past medical history on file.    No past surgical history on file.     reports that she has never smoked. She has never used smokeless tobacco.    Review of Systems  Review of Systems   Constitutional: Negative.    HENT: Negative.     Eyes: Negative.    Respiratory: Negative.     Cardiovascular:  Negative for chest pain and palpitations.   Gastrointestinal: Negative.    Endocrine: Negative.    Genitourinary: Negative.    Musculoskeletal: Negative.    Skin: Negative.    Allergic/Immunologic: Negative.    Neurological: Negative.    Hematological: Negative.    Psychiatric/Behavioral: Negative.     All other systems reviewed and are negative.                                 Objective    Vitals:    10/07/24 1908   BP: 145/90   Pulse: 86   Resp: 16   Temp: 37 °C (98.6 °F)   SpO2: 99%   Weight: 109 kg (240 lb)   Height: 1.626 m (5' 4\")     No LMP recorded.    Physical Exam  Vitals and nursing note reviewed.   Constitutional:       General: She is not in acute distress.     Appearance: Normal appearance. She is not ill-appearing or toxic-appearing.   HENT:      Head: Atraumatic.      Right Ear: Tympanic membrane, ear canal and external ear normal.      Left Ear: Tympanic membrane, ear canal and external ear normal.      Nose: Nose normal.      " Mouth/Throat:      Mouth: Mucous membranes are moist.      Pharynx: Oropharynx is clear. No oropharyngeal exudate or posterior oropharyngeal erythema.   Eyes:      Extraocular Movements: Extraocular movements intact.      Conjunctiva/sclera: Conjunctivae normal.      Pupils: Pupils are equal, round, and reactive to light.   Cardiovascular:      Rate and Rhythm: Normal rate and regular rhythm.      Pulses: Normal pulses.      Heart sounds: Normal heart sounds.   Pulmonary:      Effort: Pulmonary effort is normal.      Breath sounds: Normal breath sounds.   Abdominal:      General: Abdomen is flat. Bowel sounds are normal.      Palpations: Abdomen is soft.      Tenderness: There is no abdominal tenderness.   Musculoskeletal:      Right elbow: Normal.      Left elbow: Swelling present. Decreased range of motion. Tenderness present in medial epicondyle.      Cervical back: Normal range of motion and neck supple. No tenderness.   Skin:     General: Skin is warm and dry.      Capillary Refill: Capillary refill takes less than 2 seconds.   Neurological:      General: No focal deficit present.      Mental Status: She is alert and oriented to person, place, and time.   Psychiatric:         Mood and Affect: Mood normal.         Behavior: Behavior normal.         Thought Content: Thought content normal.         Procedures    Point of Care Test & Imaging Results from this visit  No results found for this visit on 10/07/24.   XR elbow left 3+ views    Result Date: 10/7/2024  Interpreted By:  Mainor Crespo, STUDY: XR ELBOW LEFT 3+ VIEWS; ;  10/7/2024 7:24 pm   INDICATION: Signs/Symptoms:pain in left elbow from fall today.   ,M25.522 Pain in left elbow   COMPARISON: None.   ACCESSION NUMBER(S): [id number]   ORDERING CLINICIAN: CHELY LUCAS   FINDINGS: Left elbow, three views   There is mild irregularity at the volar aspect of the coronoid process concerning for an underlying small fracture. There is a small effusion as well  in the elbow. Questionable irregularity at the radial neck as well.. There is no dislocation. There are no degenerative changes. There is no lytic or sclerotic lesion. There is no soft tissue abnormality seen.       Findings concerning for a small coronoid process fracture. Findings concerning for a radial neck fracture as well. Small joint effusion in the elbow     MACRO: None   Signed by: Mainor Crespo 10/7/2024 7:47 PM Dictation workstation:   [id number]     Diagnostic study results (if any) were reviewed by RAYRAY Tobin.    Assessment/Plan   Allergies, medications, history, and pertinent labs/EKGs/Imaging reviewed by RAYRAY Tobin.     Medical Decision Making  Pt presents with L elbow pain s/p mechanical fall.  Extension limited due to pain.  Pt with TTP over medial epicondyle.  fingers are warm to touch with brisk cap refill.  Intact pulses.   Xrays show Findings concerning for a small coronoid process fracture. Findings  concerning for a radial neck fracture as well. Small joint effusion in the elbow.  Pt placed in sling, given referral to ortho and Rx Fults.  Pt states she is established with ortho and will call in the morning for an appt.  At time of discharge patient was clinically well-appearing and HDS for outpatient management. The patient and/or family was educated regarding diagnosis, supportive care, OTC and Rx medications. The patient and/or family was given the opportunity to ask questions prior to discharge.  They verbalized understanding of my discussion of the plans for treatment, expected course, indications to return to  or seek further evaluation in ED, and the need for timely follow up as directed.   They were provided with a work/school excuse if requested.       Orders and Diagnoses  Diagnoses and all orders for this visit:  Closed nondisplaced fracture of neck of left radius, initial encounter  -     Arm Sling, Universal  -     HYDROcodone-acetaminophen  (Norco) 5-325 mg tablet; Take 1 tablet by mouth every 6 hours if needed for severe pain (7 - 10) for up to 7 days.  -     Referral to Orthopaedic Surgery; Future  Left elbow pain  -     XR elbow left 3+ views; Future      Medical Admin Record      Patient disposition: Home    Electronically signed by RAYRAY Tobin  8:10 AM

## 2024-10-07 NOTE — LETTER
October 7, 2024     Patient: Josefina Meneses   YOB: 1975   Date of Visit: 10/7/2024       To Whom It May Concern:    It is my medical opinion that Josefina Meneses may return to work on 10/9/24 .    If you have any questions or concerns, please don't hesitate to call.         Sincerely,        ARTUR Tobin-CNP    CC: No Recipients

## 2024-10-08 ENCOUNTER — OFFICE VISIT (OUTPATIENT)
Dept: ORTHOPEDIC SURGERY | Facility: CLINIC | Age: 49
End: 2024-10-08
Payer: COMMERCIAL

## 2024-10-08 DIAGNOSIS — S42.402A OCCULT FRACTURE OF LEFT ELBOW, CLOSED, INITIAL ENCOUNTER: Primary | ICD-10-CM

## 2024-10-08 PROCEDURE — L3670 SO ACRO/CLAV CAN WEB PRE OTS: HCPCS | Performed by: FAMILY MEDICINE

## 2024-10-08 PROCEDURE — 1036F TOBACCO NON-USER: CPT | Performed by: FAMILY MEDICINE

## 2024-10-08 PROCEDURE — 99214 OFFICE O/P EST MOD 30 MIN: CPT | Performed by: FAMILY MEDICINE

## 2024-10-08 PROCEDURE — 24576 CLTX HUMRL CNDYLR FX WO MNPJ: CPT | Performed by: FAMILY MEDICINE

## 2024-10-08 PROCEDURE — 24650 CLTX RDL HEAD/NCK FX WO MNPJ: CPT | Performed by: FAMILY MEDICINE

## 2024-10-08 PROCEDURE — 99214 OFFICE O/P EST MOD 30 MIN: CPT | Mod: 57 | Performed by: FAMILY MEDICINE

## 2024-10-08 RX ORDER — IBUPROFEN 800 MG/1
800 TABLET ORAL 3 TIMES DAILY
Qty: 90 TABLET | Refills: 0 | Status: SHIPPED | OUTPATIENT
Start: 2024-10-08 | End: 2024-11-07

## 2024-10-08 NOTE — LETTER
October 8, 2024     Patient: Josefina Meneses   YOB: 1975   Date of Visit: 10/8/2024       To Whom It May Concern:    It is my medical opinion that Josefina Meneses may return to light duty immediately with the following restrictions: no use of left arm for 2 weeks or until 10/22/2024 .    If you have any questions or concerns, please don't hesitate to call.         Sincerely,        Cole C Budinsky, MD    CC: No Recipients

## 2024-10-08 NOTE — PROGRESS NOTES
Acute Injury New Patient Visit    CC:   Chief Complaint   Patient presents with    Left Elbow - Pain     Fall 10/07/24  X rays [facility]       HPI: Josefina is a 48 y.o.female who presents today with new complaints of acute complaints of pain discomfort to the left elbow status post fall.  She had injured her self yesterday when she lost her footing and fell she does have a history of hypermobile elbows she points to the lateral and posterior aspect of the elbow as area most pain and discomfort.  There is concern for radial head/neck injury in addition to a anterior process of the olecranon avulsion.  She denies any numbness tingling or burning has stiffness and soreness.  She also bumped the lateral side of her knee she currently follows with Dr. Diego Garcia for ongoing knee pain.        Review of Systems   GENERAL: Negative for malaise, significant weight loss, fever  MUSCULOSKELETAL: See HPI  NEURO: Negative for numbness / tingling     Past Medical History  History reviewed. No pertinent past medical history.    Medication review  Medication Documentation Review Audit       Reviewed by Cole C Budinsky, MD (Physician) on 10/08/24 at 0919      Medication Order Taking? Sig Documenting Provider Last Dose Status   HYDROcodone-acetaminophen (Norco) 5-325 mg tablet [id number]  Take 1 tablet by mouth every 6 hours if needed for severe pain (7 - 10) for up to 7 days. ARTUR Tobin-JACQUELINE  Active                    Allergies  No Known Allergies    Social History  Social History     Socioeconomic History    Marital status:      Spouse name: Not on file    Number of children: Not on file    Years of education: Not on file    Highest education level: Not on file   Occupational History    Not on file   Tobacco Use    Smoking status: Never    Smokeless tobacco: Never   Substance and Sexual Activity    Alcohol use: Not on file    Drug use: Not on file    Sexual activity: Not on file   Other Topics Concern    Not on file    Social History Narrative    Not on file     Social Determinants of Health     Financial Resource Strain: Not on file   Food Insecurity: Not on file   Transportation Needs: Not on file   Physical Activity: Not on file   Stress: Not on file   Social Connections: Not on file   Intimate Partner Violence: Not on file   Housing Stability: Not on file       Surgical History  History reviewed. No pertinent surgical history.    Physical Exam:  GENERAL:  Patient is awake, alert, and oriented to person place and time.  Patient appears well nourished and well kept.  Affect Calm, Not Acutely Distressed.  HEENT:  Normocephalic, Atraumatic, EOMI  CARDIOVASCULAR:  Hemodynamically stable.  RESPIRATORY:  Normal respirations with unlabored breathing.  NEURO: Gross sensation intact to the upper extremities bilaterally.  Extremity: Left elbow demonstrates skin which is warm pink well-perfused soft tissue swelling noted palpable small joint effusion.  She has limited extension of the elbow lacking 10 to 15 degrees flexion is limited to 90.  There is no tenderness palpation over the medial epicondyle or soft tissues medially.  She has tenderness palpation over the radial head and lateral elbow joint.  Mild discomfort with pronation supination.  Forearm compartments otherwise soft compressible  strength equal symmetric and intact there is no pain at the left shoulder.  No open cuts wounds or sores.      Diagnostics: Previous images reviewed consistent with small anterior process olecranon avulsion and concern for nondisplaced radial head fracture        Procedure: None  Procedures    Assessment:   Problem List Items Addressed This Visit    None  Visit Diagnoses       Occult fracture of left elbow, closed, initial encounter    -  Primary    Relevant Medications    ibuprofen 800 mg tablet    Other Relevant Orders    Sling             Plan: At this time discussed with the patient there are certainly concern for occult elbow fractures  which likely would not result in a surgery or fix.  Will provide her with a posterior elbow splint and a sling for rest and healing.  She will utilize ice and ibuprofen 800 mg which she requested for prescription here today.  She can come in and out of the splint and sling as tolerated for gentle range of motion recovery when not at work.  She is provided with a light duty sitdown work note no use of the left upper extremity x 2 weeks.  We will see her back in 7 to 10 days for repeat evaluation, repeat x-rays 3 views of the left elbow at follow-up.  We will update a work note as appropriate going forward.  Additionally we will submit prior authorization for a T scope elbow brace.  She will remain in her splint and sling however until I see her back follow-up.  Orders Placed This Encounter    Sling    ibuprofen 800 mg tablet      At the conclusion of the visit there were no further questions by the patient/family regarding their plan of care.  Patient was instructed to call or return with any issues, questions, or concerns regarding their injury and/or treatment plan described above.     10/08/24 at 9:34 AM - Cole C Budinsky, MD    Office: [phone]    This note was prepared using voice recognition software.  The details of this note are correct and have been reviewed, and corrected to the best of my ability.  Some grammatical errors may persist related to the Dragon software.

## 2024-10-11 ENCOUNTER — TELEPHONE (OUTPATIENT)
Dept: ORTHOPEDIC SURGERY | Facility: CLINIC | Age: 49
End: 2024-10-11
Payer: COMMERCIAL

## 2024-10-11 NOTE — TELEPHONE ENCOUNTER
: $876 T-Scope Elbow  Coinsurance: 80/20  Patient Out of Pocket: $175.20    Individual deductible:  $250/$140 Met    Individual OOP:  $2000/$206 Met    Family Deductible:  $500/$500 Met    Family OOP:  $4000/$790 Met

## 2024-10-23 ENCOUNTER — HOSPITAL ENCOUNTER (OUTPATIENT)
Dept: RADIOLOGY | Facility: CLINIC | Age: 49
Discharge: HOME | End: 2024-10-23
Payer: COMMERCIAL

## 2024-10-23 ENCOUNTER — OFFICE VISIT (OUTPATIENT)
Dept: ORTHOPEDIC SURGERY | Facility: CLINIC | Age: 49
End: 2024-10-23
Payer: COMMERCIAL

## 2024-10-23 DIAGNOSIS — S42.402A OCCULT FRACTURE OF LEFT ELBOW, CLOSED, INITIAL ENCOUNTER: ICD-10-CM

## 2024-10-23 PROCEDURE — 1036F TOBACCO NON-USER: CPT | Performed by: FAMILY MEDICINE

## 2024-10-23 PROCEDURE — L3761 EO, ADJ LOCK JOINT PREFAB OT: HCPCS | Performed by: FAMILY MEDICINE

## 2024-10-23 PROCEDURE — 99211 OFF/OP EST MAY X REQ PHY/QHP: CPT | Performed by: FAMILY MEDICINE

## 2024-10-23 PROCEDURE — 24576 CLTX HUMRL CNDYLR FX WO MNPJ: CPT | Performed by: FAMILY MEDICINE

## 2024-10-23 PROCEDURE — 73080 X-RAY EXAM OF ELBOW: CPT | Mod: LT

## 2024-10-23 PROCEDURE — 73080 X-RAY EXAM OF ELBOW: CPT | Mod: LEFT SIDE | Performed by: FAMILY MEDICINE

## 2024-10-23 NOTE — PROGRESS NOTES
Established Patient Follow-Up Visit    CC:   Chief Complaint   Patient presents with    Left Elbow - Follow-up     Occult fx  Xray today       HPI:  Josefina is a 48 y.o. female returns here today for follow-up visit regarding: Follow-up of left lateral elbow pain.  She has been compliant with the posterior elbow splint and sling.  She states that she has a little bit of improved range of motion and some mild discomfort but not nearly as painful or uncomfortable as she was previously.  She presents here today for repeat evaluation.  She denies any numbness tingling or burning.  Denies any new injury or trauma to the lateral epicondyle as the area most affected.          REVIEW OF SYSTEMS:  GENERAL: Negative for malaise, significant weight loss, fever  MUSCULOSKELETAL: See HPI  NEURO: Negative for numbness / tingling       PHYSICAL EXAM:  -Neuro: Gross sensation intact to the upper extremities bilaterally.  -Extremity: Left elbow demonstrate skin which is warm pink well-perfused tenderness palpation over the lateral epicondyle and lateral aspect of the joint.  She has no olecranon pain no obvious radial head pain.  She has normal pronation supination without discomfort forearm compartments are compressible pulses sensation are intact throughout.  She has tenderness over the lateral epicondyle and limited terminal extension of the elbow lacking about 5 degrees she is able to nearly get to 0 when compared to the contralateral right upper extremity she does have some hypermobility probably -5 degrees of extension.  Left elbow flexion is limited to about 110 degrees.  No laxity with valgus stress.  Distal biceps and triceps are nontender.    IMAGING: Repeat x-rays today demonstrate stable appearing elbow effusion with no obvious presence for any displaced fracture.  Question subtle coracoid process avulsion and also question possible lateral epicondyle sclerotic change but no visible fracture line present.      PROCEDURE:  None  Procedures     ASSESSMENT:   Follow-up visit for:  Problem List Items Addressed This Visit    None  Visit Diagnoses       Occult fracture of left elbow, closed, initial encounter        Relevant Orders    XR elbow left 3+ views             PLAN: At this time we will offer the patient a T scope elbow brace to be fully unlocked.  Should she have any worsening pain or discomfort she can return and we can lock it at 90 degrees however I would like her to work on gentle range of motion recovery as tolerated.  She should continue with ice Tylenol and anti-inflammatories as needed for pain control.  She is to call return with any issues otherwise we will tentatively plan to see her back in 3 weeks for repeat evaluation.  Discussed with the patient could be a small nondisplaced fracture to the lateral epicondyle, and will continue with nonoperative management of this occult fracture going forward.  She should call or return with issues.  Repeat x-rays 3 views left elbow at follow-up.  Also recommended vitamin D supplementation to assist with any potential fracture healing.  Orders Placed This Encounter    XR elbow left 3+ views           At the conclusion of the visit there were no further questions by the patient/family regarding their plan of care.  Patient was instructed to call or return with any issues, questions, or concerns regarding their injury and/or treatment plan described above.     10/23/24 at 12:18 PM - Cole C Budinsky, MD    Office: [phone]    This note was prepared using voice recognition software.  The details of this note are correct and have been reviewed, and corrected to the best of my ability.  Some grammatical errors may persist related to the Dragon software.

## 2024-10-23 NOTE — LETTER
October 23, 2024     Patient: Josefina Meneses   YOB: 1975   Date of Visit: 10/23/2024       To Whom It May Concern:    Josefina Meneses was seen in my clinic on 10/23/2024 at 10:15 am. Please excuse Josefina for her absence from work on this day to make the appointment.    If you have any questions or concerns, please don't hesitate to call.         Sincerely,         Cole C Budinsky, MD

## 2024-11-13 ENCOUNTER — HOSPITAL ENCOUNTER (OUTPATIENT)
Dept: RADIOLOGY | Facility: CLINIC | Age: 49
Discharge: HOME | End: 2024-11-13
Payer: COMMERCIAL

## 2024-11-13 ENCOUNTER — OFFICE VISIT (OUTPATIENT)
Dept: ORTHOPEDIC SURGERY | Facility: CLINIC | Age: 49
End: 2024-11-13
Payer: COMMERCIAL

## 2024-11-13 DIAGNOSIS — S42.402A OCCULT FRACTURE OF LEFT ELBOW, CLOSED, INITIAL ENCOUNTER: ICD-10-CM

## 2024-11-13 DIAGNOSIS — M25.522 LEFT ELBOW PAIN: ICD-10-CM

## 2024-11-13 PROCEDURE — 73080 X-RAY EXAM OF ELBOW: CPT | Mod: LT

## 2024-11-13 PROCEDURE — 99211 OFF/OP EST MAY X REQ PHY/QHP: CPT | Performed by: FAMILY MEDICINE

## 2024-11-13 NOTE — PROGRESS NOTES
Established Patient Follow-Up Visit    CC:   Chief Complaint   Patient presents with    Left Elbow - Follow-up     Occult fx  Xray today       HPI:  Josefina is a 48 y.o. female returns here today for follow-up visit regarding: Occult left elbow fracture.  She has been compliant with her T scope elbow brace.  She denies any worsening issues overall feeling quite well.  Does have a little bit of lacking of terminal extension and flexion but overall decreased swelling and pain.          REVIEW OF SYSTEMS:  GENERAL: Negative for malaise, significant weight loss, fever  MUSCULOSKELETAL: See HPI  NEURO: Negative for numbness / tingling       PHYSICAL EXAM:  -Neuro: Gross sensation intact to the upper extremities bilaterally.  -Extremity: Left elbow demonstrates minimal discomfort over the radial head she lacks terminal extension by 3 to 5 degrees flexion is improved but just less than normal on the comparative right elbow.  No laxity with valgus stress.  Forearm compartment soft compressible    IMAGING: Repeat x-rays today demonstrate no obvious presence for fracture or dislocation.      PROCEDURE: None  Procedures     ASSESSMENT:   Follow-up visit for:  Problem List Items Addressed This Visit    None  Visit Diagnoses       Occult fracture of left elbow, closed, initial encounter        Relevant Orders    XR elbow left 3+ views    Left elbow pain                 PLAN: At this time we will offer the patient follow-up in 6 weeks we will hold off on repeating x-rays.  If necessary will consider further advanced imaging with an MRI.  He can consider intra-articular steroid injection for any residual swelling or discomfort.  Patient will continue work on increased range of motion and activities as tolerated recommend she completely discontinue and wean from her T scope elbow brace as able going forward.  Orders Placed This Encounter    XR elbow left 3+ views           At the conclusion of the visit there were no further  questions by the patient/family regarding their plan of care.  Patient was instructed to call or return with any issues, questions, or concerns regarding their injury and/or treatment plan described above.     11/13/24 at 3:07 PM - Cole C Budinsky, MD    Office: [phone]    This note was prepared using voice recognition software.  The details of this note are correct and have been reviewed, and corrected to the best of my ability.  Some grammatical errors may persist related to the Dragon software.

## 2024-12-16 NOTE — PROGRESS NOTES
History of Present Illness:   We gave her bilateral knee cortisone injections on 08/12/24. Injections give her about 3-4 months of relief.     08/12/24: Bilateral knee cortisone injections 04/15/24, reports the injection worked well for the past few months. Over the last couple of weeks the knees began bothering her again. Denies any new injury. Unfortunately her insurance company did not approve gel injections, requesting another cortisone injection today.     04/15/24: Patient presents for bilateral cortisone injections.  She states that done these in the past with excellent relief.  Last time we injected her knees were in October 2023.  She has, potentially also doing gel injection in the future she has several months of relief.  With these pre-CERT it.  She is taking ibuprofen which simply helps.  Ices her knees has a home-based exercise plan.    10/30/23: Patient presents for follow-up from her left knee cortisone injection. States that knee felt about 40 to 50% better. She was also on vacation. She still has pain on the inside part of the knee and pain going up and down stairs.     10/02/23: Patient presents today for chief complaint of chronic bilateral knee pain. States that the knees have been acting up the past 3 to 4 months without relief. She does take anti-inflammatories it does take the edge off a little bit. She denies any surgeries or previous injections into the knees denies any instability no locking. She has not used any type of compression sleeve      Imaging:  Bilateral grade 4 medial arthritis with a varus collapse    Assessment:  Bilateral knee arthritis, left greater than right     Plan:  Bilateral cortisone injections today  Ibuprofen.  Sent to the pharmacy.    Ice  Over-the-counter bracing  Encouraged her to get in the pool for home exercise  Follow-up as needed    L Inj/Asp: bilateral knee on 12/17/2024 4:58 PM  Indications: pain  Details: 22 G needle, anterolateral approach  Medications  (Right): 8 mL lidocaine 10 mg/mL (1 %); 2.5 mg triamcinolone acetonide 40 mg/mL  Medications (Left): 8 mL lidocaine 10 mg/mL (1 %); 2.5 mg triamcinolone acetonide 40 mg/mL  Outcome: tolerated well, no immediate complications  Procedure, treatment alternatives, risks and benefits explained, specific risks discussed. Consent was given by the patient. Immediately prior to procedure a time out was called to verify the correct patient, procedure, equipment, support staff and site/side marked as required. Patient was prepped and draped in the usual sterile fashion.           Physical Exam:  General appearance well-nourished well-developed A&Ox3  Bilateral knee: Flexion to 110, 5 out of 5 quad strength.  Trace effusion.  Some pain along the medial joint line.  Normal stability    Review of Systems:   GENERAL: Negative for malaise, significant weight loss, fever  MUSCULOSKELETAL: see HPI  NEURO:  Negative  No past medical history on file.    Medication Documentation Review Audit       Reviewed by Cole C Budinsky, MD (Physician) on 10/23/24 at 1218      Medication Order Taking? Sig Documenting Provider Last Dose Status   ibuprofen 800 mg tablet [id number]  Take 1 tablet (800 mg) by mouth 3 times a day. Cole C Budinsky, MD  Active                    No Known Allergies    Social History     Socioeconomic History    Marital status:      Spouse name: Not on file    Number of children: Not on file    Years of education: Not on file    Highest education level: Not on file   Occupational History    Not on file   Tobacco Use    Smoking status: Never    Smokeless tobacco: Never   Substance and Sexual Activity    Alcohol use: Not on file    Drug use: Not on file    Sexual activity: Not on file   Other Topics Concern    Not on file   Social History Narrative    Not on file     Social Drivers of Health     Financial Resource Strain: Not on file   Food Insecurity: Not on file   Transportation Needs: Not on file   Physical Activity:  Not on file   Stress: Not on file   Social Connections: Not on file   Intimate Partner Violence: Not on file   Housing Stability: Not on file       No past surgical history on file.     Scribe Attestation  By signing my name below, I, Apolinar White   attest that this documentation has been prepared under the direction and in the presence of Diego Garcia MD.

## 2024-12-17 ENCOUNTER — OFFICE VISIT (OUTPATIENT)
Dept: ORTHOPEDIC SURGERY | Facility: CLINIC | Age: 49
End: 2024-12-17
Payer: COMMERCIAL

## 2024-12-17 DIAGNOSIS — M17.0 PRIMARY OSTEOARTHRITIS OF BOTH KNEES: Primary | ICD-10-CM

## 2024-12-17 PROCEDURE — 20610 DRAIN/INJ JOINT/BURSA W/O US: CPT | Mod: 50 | Performed by: ORTHOPAEDIC SURGERY

## 2024-12-17 PROCEDURE — 2500000004 HC RX 250 GENERAL PHARMACY W/ HCPCS (ALT 636 FOR OP/ED): Performed by: ORTHOPAEDIC SURGERY

## 2024-12-17 PROCEDURE — 99214 OFFICE O/P EST MOD 30 MIN: CPT | Performed by: ORTHOPAEDIC SURGERY

## 2024-12-17 RX ORDER — IBUPROFEN 800 MG/1
800 TABLET ORAL EVERY 8 HOURS PRN
Qty: 90 TABLET | Refills: 2 | Status: SHIPPED | OUTPATIENT
Start: 2024-12-17 | End: 2024-12-18 | Stop reason: SDUPTHER

## 2024-12-20 RX ORDER — LIDOCAINE HYDROCHLORIDE 10 MG/ML
8 INJECTION, SOLUTION INFILTRATION; PERINEURAL
Status: COMPLETED | OUTPATIENT
Start: 2024-12-17 | End: 2024-12-17

## 2024-12-20 RX ORDER — TRIAMCINOLONE ACETONIDE 40 MG/ML
2.5 INJECTION, SUSPENSION INTRA-ARTICULAR; INTRAMUSCULAR
Status: COMPLETED | OUTPATIENT
Start: 2024-12-17 | End: 2024-12-17

## 2025-01-03 ENCOUNTER — APPOINTMENT (OUTPATIENT)
Dept: ORTHOPEDIC SURGERY | Facility: CLINIC | Age: 50
End: 2025-01-03
Payer: COMMERCIAL

## 2025-04-17 NOTE — PROGRESS NOTES
History of Present Illness:   4/22/25: We gave her a cortisone injection into the bilateral knee on 12/17/24.     12/17/24: We gave her bilateral knee cortisone injections on 08/12/24. Injections give her about 3-4 months of relief.     8/12/24: Bilateral knee cortisone injections 04/15/24, reports the injection worked well for the past few months. Over the last couple of weeks the knees began bothering her again. Denies any new injury. Unfortunately her insurance company did not approve gel injections, requesting another cortisone injection today.     4/15/24: Patient presents for bilateral cortisone injections.  She states that done these in the past with excellent relief.  Last time we injected her knees were in October 2023.  She has, potentially also doing gel injection in the future she has several months of relief.  With these pre-CERT it.  She is taking ibuprofen which simply helps.  Ices her knees has a home-based exercise plan.    10/30/23: Patient presents for follow-up from her left knee cortisone injection. States that knee felt about 40 to 50% better. She was also on vacation. She still has pain on the inside part of the knee and pain going up and down stairs.     10/2/23: Patient presents today for chief complaint of chronic bilateral knee pain. States that the knees have been acting up the past 3 to 4 months without relief. She does take anti-inflammatories it does take the edge off a little bit. She denies any surgeries or previous injections into the knees denies any instability no locking. She has not used any type of compression sleeve      Imaging:  Bilateral grade 4 medial arthritis with a varus collapse    Assessment:  Bilateral knee arthritis, left greater than right     Plan:  Bilateral cortisone injections today  Ibuprofen.  Sent to the pharmacy.    Ice  Over-the-counter bracing  Encouraged her to get in the pool for home exercise  Follow-up as needed    Physical Exam:  General appearance  well-nourished well-developed A&Ox3  Bilateral knee: Flexion to 110, 5 out of 5 quad strength.  Trace effusion.  Some pain along the medial joint line.  Normal stability    Review of Systems:   GENERAL: Negative for malaise, significant weight loss, fever  MUSCULOSKELETAL: see HPI  NEURO:  Negative  No past medical history on file.    Medication Documentation Review Audit       Reviewed by Christine Domingo PA-C (Physician Assistant) on 12/18/24 at 1512      Medication Order Taking? Sig Documenting Provider Last Dose Status     Discontinued 12/18/24 1511   ibuprofen 800 mg tablet [id number]  Take 1 tablet (800 mg) by mouth every 8 hours if needed for mild pain (1 - 3). Christine Domingo PA-C  Active                    No Known Allergies    Social History     Socioeconomic History    Marital status:      Spouse name: Not on file    Number of children: Not on file    Years of education: Not on file    Highest education level: Not on file   Occupational History    Not on file   Tobacco Use    Smoking status: Never    Smokeless tobacco: Never   Substance and Sexual Activity    Alcohol use: Not on file    Drug use: Not on file    Sexual activity: Not on file   Other Topics Concern    Not on file   Social History Narrative    Not on file     Social Drivers of Health     Financial Resource Strain: Not on file   Food Insecurity: Not on file   Transportation Needs: Not on file   Physical Activity: Not on file   Stress: Not on file   Social Connections: Not on file   Intimate Partner Violence: Not on file   Housing Stability: Not on file       No past surgical history on file.     Scribe Attestation  By signing my name below, IMari Scribe   attest that this documentation has been prepared under the direction and in the presence of Diego Garcia MD.

## 2025-04-22 ENCOUNTER — APPOINTMENT (OUTPATIENT)
Dept: ORTHOPEDIC SURGERY | Facility: CLINIC | Age: 50
End: 2025-04-22

## 2025-04-22 ENCOUNTER — OFFICE VISIT (OUTPATIENT)
Dept: ORTHOPEDIC SURGERY | Facility: CLINIC | Age: 50
End: 2025-04-22
Payer: COMMERCIAL

## 2025-04-22 DIAGNOSIS — M17.0 BILATERAL PRIMARY OSTEOARTHRITIS OF KNEE: Primary | ICD-10-CM

## 2025-04-22 PROCEDURE — 2500000004 HC RX 250 GENERAL PHARMACY W/ HCPCS (ALT 636 FOR OP/ED): Performed by: ORTHOPAEDIC SURGERY

## 2025-04-22 PROCEDURE — 20610 DRAIN/INJ JOINT/BURSA W/O US: CPT | Mod: 50 | Performed by: PHYSICIAN ASSISTANT

## 2025-04-22 PROCEDURE — 20610 DRAIN/INJ JOINT/BURSA W/O US: CPT | Mod: 50 | Performed by: ORTHOPAEDIC SURGERY

## 2025-04-22 PROCEDURE — 99213 OFFICE O/P EST LOW 20 MIN: CPT | Performed by: ORTHOPAEDIC SURGERY

## 2025-04-22 PROCEDURE — 99212 OFFICE O/P EST SF 10 MIN: CPT | Performed by: ORTHOPAEDIC SURGERY

## 2025-04-22 PROCEDURE — 2500000004 HC RX 250 GENERAL PHARMACY W/ HCPCS (ALT 636 FOR OP/ED): Performed by: PHYSICIAN ASSISTANT

## 2025-04-22 RX ORDER — LIDOCAINE HYDROCHLORIDE 10 MG/ML
8 INJECTION, SOLUTION INFILTRATION; PERINEURAL
Status: COMPLETED | OUTPATIENT
Start: 2025-04-22 | End: 2025-04-22

## 2025-04-22 RX ORDER — TRIAMCINOLONE ACETONIDE 40 MG/ML
40 INJECTION, SUSPENSION INTRA-ARTICULAR; INTRAMUSCULAR
Status: COMPLETED | OUTPATIENT
Start: 2025-04-22 | End: 2025-04-22

## 2025-04-22 RX ADMIN — TRIAMCINOLONE ACETONIDE 40 MG: 40 INJECTION, SUSPENSION INTRA-ARTICULAR; INTRAMUSCULAR at 10:15

## 2025-04-22 RX ADMIN — LIDOCAINE HYDROCHLORIDE 8 ML: 10 INJECTION, SOLUTION INFILTRATION; PERINEURAL at 10:15

## 2025-04-22 NOTE — LETTER
April 22, 2025     Patient: Josefina Meneses   YOB: 1975   Date of Visit: 4/22/2025       To Whom It May Concern:    Please excuse Josefina Meneses for time missed from work on 4/21/2025 and 4/22/2025 as she was under my care.     If you have any questions or concerns, please don't hesitate to call.         Sincerely,        Diego Garcia MD

## 2025-04-22 NOTE — PROGRESS NOTES
History of Present Illness:   4/22/25: We gave her a cortisone injection into the bilateral knee on 12/17/24.  She states those injections gave her about 3-1/2 months of relief.  More recently the knees became sore and bothersome after going up and down stairs frequently.  She states that the left knee is very sore.  She is interested in another round of cortisone injections today.    12/17/24: We gave her bilateral knee cortisone injections on 08/12/24. Injections give her about 3-4 months of relief.     8/12/24: Bilateral knee cortisone injections 04/15/24, reports the injection worked well for the past few months. Over the last couple of weeks the knees began bothering her again. Denies any new injury. Unfortunately her insurance company did not approve gel injections, requesting another cortisone injection today.     4/15/24: Patient presents for bilateral cortisone injections.  She states that done these in the past with excellent relief.  Last time we injected her knees were in October 2023.  She has, potentially also doing gel injection in the future she has several months of relief.  With these pre-CERT it.  She is taking ibuprofen which simply helps.  Ices her knees has a home-based exercise plan.    10/30/23: Patient presents for follow-up from her left knee cortisone injection. States that knee felt about 40 to 50% better. She was also on vacation. She still has pain on the inside part of the knee and pain going up and down stairs.     10/2/23: Patient presents today for chief complaint of chronic bilateral knee pain. States that the knees have been acting up the past 3 to 4 months without relief. She does take anti-inflammatories it does take the edge off a little bit. She denies any surgeries or previous injections into the knees denies any instability no locking. She has not used any type of compression sleeve.      Imaging:  Bilateral grade 4 medial arthritis with a varus  collapse    Assessment:  Bilateral knee arthritis, left greater than right     Plan:  Bilateral cortisone injections today  Ibuprofen.  Sent to the pharmacy.    Ice  Over-the-counter bracing  Order for aqua therapy  Follow-up in 3 months for repeat injections as needed    L Inj/Asp: bilateral knee on 4/22/2025 10:15 AM  Indications: pain  Details: 22 G needle, anterolateral approach  Medications (Right): 8 mL lidocaine 10 mg/mL (1 %); 40 mg triamcinolone acetonide 40 mg/mL  Medications (Left): 8 mL lidocaine 10 mg/mL (1 %); 40 mg triamcinolone acetonide 40 mg/mL  Outcome: tolerated well, no immediate complications  Procedure, treatment alternatives, risks and benefits explained, specific risks discussed. Consent was given by the patient. Immediately prior to procedure a time out was called to verify the correct patient, procedure, equipment, support staff and site/side marked as required. Patient was prepped and draped in the usual sterile fashion.           Physical Exam:  General appearance well-nourished well-developed A&Ox3  Bilateral knee: Flexion to 110, 5 out of 5 quad strength.  Trace effusion.  Some pain along the medial joint line.  Normal stability    Review of Systems:   GENERAL: Negative for malaise, significant weight loss, fever  MUSCULOSKELETAL: see HPI  NEURO:  Negative  No past medical history on file.    Medication Documentation Review Audit       Reviewed by Christine Domingo PA-C (Physician Assistant) on 12/18/24 at 1512      Medication Order Taking? Sig Documenting Provider Last Dose Status     Discontinued 12/18/24 1511   ibuprofen 800 mg tablet [id number]  Take 1 tablet (800 mg) by mouth every 8 hours if needed for mild pain (1 - 3). Christine Domingo PA-C  Active                    No Known Allergies    Social History     Socioeconomic History    Marital status:      Spouse name: Not on file    Number of children: Not on file    Years of education: Not on file    Highest education level:  Not on file   Occupational History    Not on file   Tobacco Use    Smoking status: Never    Smokeless tobacco: Never   Substance and Sexual Activity    Alcohol use: Not on file    Drug use: Not on file    Sexual activity: Not on file   Other Topics Concern    Not on file   Social History Narrative    Not on file     Social Drivers of Health     Financial Resource Strain: Not on file   Food Insecurity: Not on file   Transportation Needs: Not on file   Physical Activity: Not on file   Stress: Not on file   Social Connections: Not on file   Intimate Partner Violence: Not on file   Housing Stability: Not on file       No past surgical history on file.     Scribe Attestation  By signing my name below, Mari MORTENSEN Scribe   attest that this documentation has been prepared under the direction and in the presence of Diego Garcia MD.

## 2025-07-21 NOTE — PROGRESS NOTES
Chief Complaint   Patient presents with    Right Knee - Follow-up     B/L OA S/p cortisone inj 4/22/2025    Left Knee - Follow-up     B/L OA S/p cortisone inj 4/22/2025     History of Present Illness:   7/22/2025: We gave her a cortisone injection into the bilateral knee on 4/22/25.  She would like to repeat cortisone injections today.     4/22/2025: We gave her a cortisone injection into the bilateral knee on 12/17/24.  She states those injections gave her about 3-1/2 months of relief.  More recently the knees became sore and bothersome after going up and down stairs frequently.  She states that the left knee is very sore.  She is interested in another round of cortisone injections today.    12/17/2024: We gave her bilateral knee cortisone injections on 08/12/24. Injections give her about 3-4 months of relief.     8/12/2024: Bilateral knee cortisone injections 04/15/24, reports the injection worked well for the past few months. Over the last couple of weeks the knees began bothering her again. Denies any new injury. Unfortunately her insurance company did not approve gel injections, requesting another cortisone injection today.     4/15/2024: Patient presents for bilateral cortisone injections.  She states that done these in the past with excellent relief.  Last time we injected her knees were in October 2023.  She has, potentially also doing gel injection in the future she has several months of relief.  With these pre-CERT it.  She is taking ibuprofen which simply helps.  Ices her knees has a home-based exercise plan.    10/30/2023: Patient presents for follow-up from her left knee cortisone injection. States that knee felt about 40 to 50% better. She was also on vacation. She still has pain on the inside part of the knee and pain going up and down stairs.     10/2/2023: Patient presents today for chief complaint of chronic bilateral knee pain. States that the knees have been acting up the past 3 to 4 months  without relief. She does take anti-inflammatories it does take the edge off a little bit. She denies any surgeries or previous injections into the knees denies any instability no locking. She has not used any type of compression sleeve.      Imaging:  Bilateral grade 4 medial arthritis with a varus collapse    Assessment:  Bilateral knee arthritis, left greater than right     Plan:  We gave her cortisone injections in the bilateral knees today. She does understand the potential needed benefit of knee replacement in the future.   Continue with activities as tolerated.  Ice and NSAIDs as needed.   Follow-up in 3 months for repeat injections as needed    L Inj/Asp: bilateral knee on 7/22/2025 4:15 PM  Indications: pain  Details: 22 G needle, anterolateral approach  Medications (Right): 8 mL lidocaine 10 mg/mL (1 %); 2.5 mg triamcinolone acetonide 40 mg/mL  Medications (Left): 8 mL lidocaine 10 mg/mL (1 %); 2.5 mg triamcinolone acetonide 40 mg/mL  Outcome: tolerated well, no immediate complications  Procedure, treatment alternatives, risks and benefits explained, specific risks discussed. Consent was given by the patient. Immediately prior to procedure a time out was called to verify the correct patient, procedure, equipment, support staff and site/side marked as required. Patient was prepped and draped in the usual sterile fashion.          Physical Exam:  General appearance well-nourished well-developed A&Ox3  Bilateral knee: Flexion to 110, 5 out of 5 quad strength.  Trace effusion.  Some pain along the medial joint line.  Normal stability    Review of Systems:   GENERAL: Negative for malaise, significant weight loss, fever  MUSCULOSKELETAL: see HPI  NEURO:  Negative  No past medical history on file.    Medication Documentation Review Audit       Reviewed by Nahed Lim MA (Medical Assistant) on 04/22/25 at 0921      Medication Order Taking? Sig Documenting Provider Last Dose Status            No Medications to  Display                                   No Known Allergies    Social History     Socioeconomic History    Marital status:      Spouse name: Not on file    Number of children: Not on file    Years of education: Not on file    Highest education level: Not on file   Occupational History    Not on file   Tobacco Use    Smoking status: Never    Smokeless tobacco: Never   Substance and Sexual Activity    Alcohol use: Not on file    Drug use: Not on file    Sexual activity: Not on file   Other Topics Concern    Not on file   Social History Narrative    Not on file     Social Drivers of Health     Financial Resource Strain: Not on file   Food Insecurity: Not on file   Transportation Needs: Not on file   Physical Activity: Not on file   Stress: Not on file   Social Connections: Not on file   Intimate Partner Violence: Not on file   Housing Stability: Not on file       No past surgical history on file.     Scribe Attestation  By signing my name below, Mari MORTENSEN Scribe   attest that this documentation has been prepared under the direction and in the presence of Diego Garcia MD.

## 2025-07-22 ENCOUNTER — OFFICE VISIT (OUTPATIENT)
Dept: ORTHOPEDIC SURGERY | Facility: CLINIC | Age: 50
End: 2025-07-22
Payer: COMMERCIAL

## 2025-07-22 DIAGNOSIS — M17.0 BILATERAL PRIMARY OSTEOARTHRITIS OF KNEE: Primary | ICD-10-CM

## 2025-07-22 PROCEDURE — 2500000004 HC RX 250 GENERAL PHARMACY W/ HCPCS (ALT 636 FOR OP/ED): Performed by: ORTHOPAEDIC SURGERY

## 2025-07-22 PROCEDURE — 20610 DRAIN/INJ JOINT/BURSA W/O US: CPT | Mod: 50 | Performed by: ORTHOPAEDIC SURGERY

## 2025-07-22 PROCEDURE — 99212 OFFICE O/P EST SF 10 MIN: CPT | Mod: 25 | Performed by: ORTHOPAEDIC SURGERY

## 2025-07-22 PROCEDURE — 99214 OFFICE O/P EST MOD 30 MIN: CPT | Performed by: ORTHOPAEDIC SURGERY

## 2025-07-22 RX ORDER — TRIAMCINOLONE ACETONIDE 40 MG/ML
2.5 INJECTION, SUSPENSION INTRA-ARTICULAR; INTRAMUSCULAR
Status: COMPLETED | OUTPATIENT
Start: 2025-07-22 | End: 2025-07-22

## 2025-07-22 RX ORDER — LIDOCAINE HYDROCHLORIDE 10 MG/ML
8 INJECTION, SOLUTION INFILTRATION; PERINEURAL
Status: COMPLETED | OUTPATIENT
Start: 2025-07-22 | End: 2025-07-22

## 2025-07-22 RX ADMIN — LIDOCAINE HYDROCHLORIDE 8 ML: 10 INJECTION, SOLUTION INFILTRATION; PERINEURAL at 16:15

## 2025-07-22 RX ADMIN — TRIAMCINOLONE ACETONIDE 2.5 MG: 40 INJECTION, SUSPENSION INTRA-ARTICULAR; INTRAMUSCULAR at 16:15
